# Patient Record
Sex: FEMALE | Race: BLACK OR AFRICAN AMERICAN | NOT HISPANIC OR LATINO | URBAN - METROPOLITAN AREA
[De-identification: names, ages, dates, MRNs, and addresses within clinical notes are randomized per-mention and may not be internally consistent; named-entity substitution may affect disease eponyms.]

---

## 2024-11-14 ENCOUNTER — NURSING HOME VISIT (OUTPATIENT)
Dept: POST ACUTE CARE | Facility: EXTERNAL LOCATION | Age: 84
End: 2024-11-14
Payer: MEDICARE

## 2024-11-14 DIAGNOSIS — F03.B0 MODERATE DEMENTIA WITHOUT BEHAVIORAL DISTURBANCE, PSYCHOTIC DISTURBANCE, MOOD DISTURBANCE, OR ANXIETY, UNSPECIFIED DEMENTIA TYPE: ICD-10-CM

## 2024-11-14 DIAGNOSIS — R44.3 HALLUCINATION: ICD-10-CM

## 2024-11-14 DIAGNOSIS — E86.0 DEHYDRATION: ICD-10-CM

## 2024-11-14 DIAGNOSIS — Z86.79 HISTORY OF CAROTID ARTERY DISEASE: ICD-10-CM

## 2024-11-14 DIAGNOSIS — E46 MALNUTRITION, UNSPECIFIED TYPE (MULTI): ICD-10-CM

## 2024-11-14 DIAGNOSIS — R13.10 DYSPHAGIA, UNSPECIFIED TYPE: ICD-10-CM

## 2024-11-14 DIAGNOSIS — Z86.69 HX OF SEIZURE DISORDER: ICD-10-CM

## 2024-11-14 DIAGNOSIS — E83.42 HYPOMAGNESEMIA: ICD-10-CM

## 2024-11-14 DIAGNOSIS — N17.9 ACUTE RENAL FAILURE, UNSPECIFIED ACUTE RENAL FAILURE TYPE (CMS-HCC): ICD-10-CM

## 2024-11-14 DIAGNOSIS — E87.1 HYPONATREMIA: ICD-10-CM

## 2024-11-14 DIAGNOSIS — R41.0 ACUTE DELIRIUM: Primary | ICD-10-CM

## 2024-11-14 PROCEDURE — 99305 1ST NF CARE MODERATE MDM 35: CPT | Performed by: INTERNAL MEDICINE

## 2024-11-14 NOTE — PROGRESS NOTES
Nursing Home  History & Physical Visit    Name: Noah Navarrete  MRN: [id number]  YOB: 1940  Date of Service: 11/14/2024      History of Present Illness  Hx mainly from available records and shared EMR . Pt is unable to give any history . She was admitted to [facility] with acute change in mental status on top of her underlying dementia. She was admitted with delirium , hallucinations and deconditioning. She was noted to be dehydrated, with low Mg and Low Sodium. She was stabilized at hosp and now she is here for futher care and rehab.   She has hx of significant dementia, dysphagia,, not communicating well. She lives at home with her 2 sons.     She is not able to provide any hisotry . She is not oriented       Vital Signs  96.9, 76, 18, 100/60, 97 %  Lbs.   Physical Exam  Vitals noted   Not in acute resp  distress  Conj [place], No icterus  Sitting on easy chair,   Keeps both upper limb flexed with some difficulty to extend it.   Neck: No Cervical LN enlargement, No Thyroid enlargement   Lungs: good air entry bilaterally, no rales or rhonchi  CVS: S1 S2 + , no S3. No loud heart murmur heard.   Abdomen: Soft, non tender , BS +. no organomegaly , no CVA tenderness  CNS: Pt is alert, not oreinted, sitting with leg and arms flexed. Has hernandez ,   No spine tenderness  Extremities: No edema, No calf tenderness, Artie's sign negative.     Assessment/Plan:    1. Acute delirium ? Due to metabolic cause vs other etiology    2. Moderate dementia without behavioral disturbance, psychotic disturbance, mood disturbance, or anxiety, unspecified dementia type -hx of. On meds.    3. Hallucination as per hosp note. Pt is not oriented   4. Dehydration -doing ok now   5. Acute renal failure, unspecified acute renal failure type (CMS-[facility]) will monitor it   6. Hypomagnesemia    7. Hyponatremia    8. Dysphagia, unspecified type uses pureed diet    9. Hx of seizure disorder -on meds. Had craniotomy done in past    10. History of  carotid artery disease    11. Malnutrition, unspecified type (Multi)         Plan:     Available medical records reviewed . Patient was examined and detailed History and physical done . All questions answered to patient's satisfaction . Total time for chart reviewing , detailed examination and coordinating care with patient was > 25 minutes.     Patient is not in pain or distress by evaluation   Continue OT/PT Rehab.   Current medications are effective. advised to continue current medications.  Will continue to follow   Patient felt satisfied with plan

## 2024-11-14 NOTE — LETTER
Patient: Noah Navarrete  : 1940    Encounter Date: 2024    Nursing Home  History & Physical Visit    Name: Noah Navarrete  MRN: [id number]  YOB: 1940  Date of Service: 2024      History of Present Illness  Hx mainly from available records and shared EMR . Pt is unable to give any history . She was admitted to [facility] with acute change in mental status on top of her underlying dementia. She was admitted with delirium , hallucinations and deconditioning. She was noted to be dehydrated, with low Mg and Low Sodium. She was stabilized at hosp and now she is here for futher care and rehab.   She has hx of significant dementia, dysphagia,, not communicating well. She lives at home with her 2 sons.     She is not able to provide any hisotry . She is not oriented       Vital Signs  96.9, 76, 18, 100/60, 97 %  Lbs.   Physical Exam  Vitals noted   Not in acute resp  distress  Conj [place], No icterus  Sitting on easy chair,   Keeps both upper limb flexed with some difficulty to extend it.   Neck: No Cervical LN enlargement, No Thyroid enlargement   Lungs: good air entry bilaterally, no rales or rhonchi  CVS: S1 S2 + , no S3. No loud heart murmur heard.   Abdomen: Soft, non tender , BS +. no organomegaly , no CVA tenderness  CNS: Pt is alert, not oreinted, sitting with leg and arms flexed. Has hernandez ,   No spine tenderness  Extremities: No edema, No calf tenderness, Artie's sign negative.     Assessment/Plan:    1. Acute delirium ? Due to metabolic cause vs other etiology    2. Moderate dementia without behavioral disturbance, psychotic disturbance, mood disturbance, or anxiety, unspecified dementia type -hx of. On meds.    3. Hallucination as per hosp note. Pt is not oriented   4. Dehydration -doing ok now   5. Acute renal failure, unspecified acute renal failure type (CMS-[facility]) will monitor it   6. Hypomagnesemia    7. Hyponatremia    8. Dysphagia, unspecified type uses pureed diet    9. Hx of  seizure disorder -on meds. Had craniotomy done in past    10. History of carotid artery disease    11. Malnutrition, unspecified type (Multi)         Plan:     Available medical records reviewed . Patient was examined and detailed History and physical done . All questions answered to patient's satisfaction . Total time for chart reviewing , detailed examination and coordinating care with patient was > 25 minutes.     Patient is not in pain or distress by evaluation   Continue OT/PT Rehab.   Current medications are effective. advised to continue current medications.  Will continue to follow   Patient felt satisfied with plan      Electronically Signed By: Wes Claudio MD   11/14/24 12:38 PM

## 2024-11-18 ENCOUNTER — NURSING HOME VISIT (OUTPATIENT)
Dept: POST ACUTE CARE | Facility: EXTERNAL LOCATION | Age: 84
End: 2024-11-18
Payer: MEDICARE

## 2024-11-18 DIAGNOSIS — R41.0 ACUTE DELIRIUM: Primary | ICD-10-CM

## 2024-11-18 DIAGNOSIS — F03.B0 MODERATE DEMENTIA WITHOUT BEHAVIORAL DISTURBANCE, PSYCHOTIC DISTURBANCE, MOOD DISTURBANCE, OR ANXIETY, UNSPECIFIED DEMENTIA TYPE: ICD-10-CM

## 2024-11-18 DIAGNOSIS — Z86.69 HX OF SEIZURE DISORDER: ICD-10-CM

## 2024-11-18 PROCEDURE — 99308 SBSQ NF CARE LOW MDM 20: CPT | Performed by: INTERNAL MEDICINE

## 2024-11-18 NOTE — PROGRESS NOTES
"    Nursing home follow up visit  Name: Noah Navarrete  MRN: [id number]  YOB: 1940  Date of Service: 11/18/2024      Pt was evaluated during nursing home visit today  Patient is doing OK. Participating in therapy well  No sob, cp, PND, orthopnea  Eating ok, sleeping ok   Moving BM ok.   Tolerating medications well    Objective :  Temperature  97.0 °F  11/18/2024 04:56  Pulse  68 per minute  11/18/2024 04:56  Respirations  18 per minute  11/18/2024 04:56  Blood Pressure  112 / 70 mmHg  11/18/2024 04:56  O2 Saturation  96 %  11/18/2024 04:56  Weight  120.2 lbs / Admission BMI: 20.63  11/11/2024 12:00  Pleasantly demented. \"I live with my 90 yrs old mother and sons \"  Vitals were noted  Patient is not any acute distress  Conjunctiva- Pink, no icterus   No cervical LN enlargement   Lungs clear, s1s2 +   No edema , No calf tenderness     Assessment:    1. Acute delirium -better now    2. Moderate dementia without behavioral disturbance, psychotic disturbance, mood disturbance, or anxiety, unspecified dementia type -on QUetiapine   3. Hx of seizure disorder -doing ok on med        Plan:  Patient is doing well.   Continue OT/PT Rehab.   I have reviewed all active medications patient is currently on . Questions related to medication answered to patient's satisfaction.  Current medications are effective. advised to continue current medications.  Will continue to follow   Patient felt satisfied with plan          "

## 2024-11-18 NOTE — LETTER
"Patient: Naoh Navarrete  : 1940    Encounter Date: 2024        Nursing home follow up visit  Name: Noah Navarrete  MRN: [id number]  YOB: 1940  Date of Service: 2024      Pt was evaluated during nursing home visit today  Patient is doing OK. Participating in therapy well  No sob, cp, PND, orthopnea  Eating ok, sleeping ok   Moving BM ok.   Tolerating medications well    Objective :  Temperature  97.0 °F  2024 04:56  Pulse  68 per minute  2024 04:56  Respirations  18 per minute  2024 04:56  Blood Pressure  112 / 70 mmHg  2024 04:56  O2 Saturation  96 %  2024 04:56  Weight  120.2 lbs / Admission BMI: 20.63  2024 12:00  Pleasantly demented. \"I live with my 90 yrs old mother and sons \"  Vitals were noted  Patient is not any acute distress  Conjunctiva- Pink, no icterus   No cervical LN enlargement   Lungs clear, s1s2 +   No edema , No calf tenderness     Assessment:    1. Acute delirium -better now    2. Moderate dementia without behavioral disturbance, psychotic disturbance, mood disturbance, or anxiety, unspecified dementia type -on QUetiapine   3. Hx of seizure disorder -doing ok on med        Plan:  Patient is doing well.   Continue OT/PT Rehab.   I have reviewed all active medications patient is currently on . Questions related to medication answered to patient's satisfaction.  Current medications are effective. advised to continue current medications.  Will continue to follow   Patient felt satisfied with plan            Electronically Signed By: Wes Claudio MD   24 10:41 AM  "

## 2024-11-21 ENCOUNTER — NURSING HOME VISIT (OUTPATIENT)
Dept: POST ACUTE CARE | Facility: EXTERNAL LOCATION | Age: 84
End: 2024-11-21
Payer: MEDICARE

## 2024-11-21 DIAGNOSIS — R53.81 PHYSICAL DECONDITIONING: ICD-10-CM

## 2024-11-21 DIAGNOSIS — F03.B0 MODERATE DEMENTIA WITHOUT BEHAVIORAL DISTURBANCE, PSYCHOTIC DISTURBANCE, MOOD DISTURBANCE, OR ANXIETY, UNSPECIFIED DEMENTIA TYPE: Primary | ICD-10-CM

## 2024-11-21 DIAGNOSIS — R41.0 ACUTE DELIRIUM: ICD-10-CM

## 2024-11-21 PROCEDURE — 99308 SBSQ NF CARE LOW MDM 20: CPT | Performed by: INTERNAL MEDICINE

## 2024-11-21 NOTE — LETTER
Patient: Noah Navarrete  : 1940    Encounter Date: 2024        Nursing home follow up visit  Name: Noah Navarrete  MRN: [id number]  YOB: 1940  Date of Service: 2024      Pt was evaluated during nursing home visit today  Patient is doing OK. Participating in therapy well  No sob, cp, PND, orthopnea  Eating ok, sleeping ok   Moving BM ok. But sometimes gets constipated   Tolerating medications well    Objective :  Vitals were noted, stable  Patient is not any acute distress  Conjunctiva- Pink, no icterus   No cervical LN enlargement   Lungs clear, s1s2 +   No edema , No calf tenderness   Alert, not oriented    Assessment:    1. Moderate dementia without behavioral disturbance, psychotic disturbance, mood disturbance, or anxiety, unspecified dementia type -hx of.    2. Acute delirium -improved   3. Physical deconditioning         Plan:  Patient is doing well.   Continue OT/PT Rehab. As per rehab , she is walking 50 ' CG with walker   I have reviewed all active medications patient is currently on . Questions related to medication answered to patient's satisfaction.  Current medications are effective. advised to continue current medications.  Will continue to follow   Patient felt satisfied with plan            Electronically Signed By: Wes Claudio MD   24 12:13 PM

## 2024-11-21 NOTE — PROGRESS NOTES
Nursing home follow up visit  Name: Noah Navarrete  MRN: [id number]  YOB: 1940  Date of Service: 11/21/2024      Pt was evaluated during nursing home visit today  Patient is doing OK. Participating in therapy well  No sob, cp, PND, orthopnea  Eating ok, sleeping ok   Moving BM ok. But sometimes gets constipated   Tolerating medications well    Objective :  Vitals were noted, stable  Patient is not any acute distress  Conjunctiva- Pink, no icterus   No cervical LN enlargement   Lungs clear, s1s2 +   No edema , No calf tenderness   Alert, not oriented    Assessment:    1. Moderate dementia without behavioral disturbance, psychotic disturbance, mood disturbance, or anxiety, unspecified dementia type -hx of.    2. Acute delirium -improved   3. Physical deconditioning         Plan:  Patient is doing well.   Continue OT/PT Rehab. As per rehab , she is walking 50 ' CG with walker   I have reviewed all active medications patient is currently on . Questions related to medication answered to patient's satisfaction.  Current medications are effective. advised to continue current medications.  Will continue to follow   Patient felt satisfied with plan

## 2024-11-25 ENCOUNTER — NURSING HOME VISIT (OUTPATIENT)
Dept: PRIMARY CARE | Facility: CLINIC | Age: 84
End: 2024-11-25
Payer: MEDICARE

## 2024-11-25 DIAGNOSIS — R41.0 DELIRIUM: ICD-10-CM

## 2024-11-25 DIAGNOSIS — R53.81 PHYSICAL DECONDITIONING: ICD-10-CM

## 2024-11-25 DIAGNOSIS — F03.B0 MODERATE DEMENTIA WITHOUT BEHAVIORAL DISTURBANCE, PSYCHOTIC DISTURBANCE, MOOD DISTURBANCE, OR ANXIETY, UNSPECIFIED DEMENTIA TYPE: Primary | ICD-10-CM

## 2024-11-25 PROCEDURE — 99308 SBSQ NF CARE LOW MDM 20: CPT | Performed by: INTERNAL MEDICINE

## 2024-12-02 NOTE — PROGRESS NOTES
Nursing home visit  Name: Noah Navarrete  MRN: [id number]  YOB: 1940  Date of Service: 11/25/24      Pt was evaluated for periodic clinical evaluation today   Patient is doing OK. Goes for therapy   No sob, cp, PND, orthopnea  Eating ok, sleeping ok   Moving BM ok.   No significant pain issue  Tolerating medications well    Objective :  Vitals were noted, stable  Patient is not any acute distress  Conjunctiva- Pink, no icterus   No cervical LN enlargement   Lungs clear, s1s2 +   No edema , No calf tenderness   Alert, not oriented, moving all 4 limbs    Assessment:    1. Moderate dementia without behavioral disturbance, psychotic disturbance, mood disturbance, or anxiety, unspecified dementia type    2. Physical deconditioning    3. Delirium         Plan:  Patient is doing well.   Current medications are effective. advised to continue current medications.  I have reviewed all active medications patient is currently on . Questions related to medication answered to patient's satisfaction.  Will continue to follow   Patient felt satisfied with plan